# Patient Record
Sex: MALE | Race: WHITE | NOT HISPANIC OR LATINO | URBAN - METROPOLITAN AREA
[De-identification: names, ages, dates, MRNs, and addresses within clinical notes are randomized per-mention and may not be internally consistent; named-entity substitution may affect disease eponyms.]

---

## 2017-05-15 ENCOUNTER — EMERGENCY (EMERGENCY)
Facility: HOSPITAL | Age: 22
LOS: 1 days | Discharge: HOME | End: 2017-05-15
Attending: EMERGENCY MEDICINE | Admitting: EMERGENCY MEDICINE
Payer: COMMERCIAL

## 2017-05-15 VITALS
TEMPERATURE: 99 F | RESPIRATION RATE: 18 BRPM | OXYGEN SATURATION: 100 % | DIASTOLIC BLOOD PRESSURE: 91 MMHG | HEART RATE: 96 BPM | SYSTOLIC BLOOD PRESSURE: 124 MMHG

## 2017-05-15 PROCEDURE — 73562 X-RAY EXAM OF KNEE 3: CPT | Mod: 26,LT

## 2017-05-15 PROCEDURE — 73130 X-RAY EXAM OF HAND: CPT | Mod: 26,RT

## 2017-05-15 PROCEDURE — 99284 EMERGENCY DEPT VISIT MOD MDM: CPT

## 2017-05-15 PROCEDURE — 90471 IMMUNIZATION ADMIN: CPT

## 2017-05-15 PROCEDURE — 99284 EMERGENCY DEPT VISIT MOD MDM: CPT | Mod: 25

## 2017-05-15 PROCEDURE — 73110 X-RAY EXAM OF WRIST: CPT | Mod: 26,RT

## 2017-05-15 PROCEDURE — 73110 X-RAY EXAM OF WRIST: CPT

## 2017-05-15 PROCEDURE — 73562 X-RAY EXAM OF KNEE 3: CPT

## 2017-05-15 PROCEDURE — 73130 X-RAY EXAM OF HAND: CPT

## 2017-05-15 PROCEDURE — 90715 TDAP VACCINE 7 YRS/> IM: CPT

## 2017-05-15 RX ORDER — BACITRACIN ZINC 500 UNIT/G
1 OINTMENT IN PACKET (EA) TOPICAL ONCE
Qty: 0 | Refills: 0 | Status: COMPLETED | OUTPATIENT
Start: 2017-05-15 | End: 2017-05-15

## 2017-05-15 RX ORDER — IBUPROFEN 200 MG
600 TABLET ORAL ONCE
Qty: 0 | Refills: 0 | Status: COMPLETED | OUTPATIENT
Start: 2017-05-15 | End: 2017-05-15

## 2017-05-15 RX ORDER — TETANUS TOXOID, REDUCED DIPHTHERIA TOXOID AND ACELLULAR PERTUSSIS VACCINE, ADSORBED 5; 2.5; 8; 8; 2.5 [IU]/.5ML; [IU]/.5ML; UG/.5ML; UG/.5ML; UG/.5ML
0.5 SUSPENSION INTRAMUSCULAR ONCE
Qty: 0 | Refills: 0 | Status: COMPLETED | OUTPATIENT
Start: 2017-05-15 | End: 2017-05-15

## 2017-05-15 RX ADMIN — TETANUS TOXOID, REDUCED DIPHTHERIA TOXOID AND ACELLULAR PERTUSSIS VACCINE, ADSORBED 0.5 MILLILITER(S): 5; 2.5; 8; 8; 2.5 SUSPENSION INTRAMUSCULAR at 13:05

## 2017-05-15 RX ADMIN — Medication 600 MILLIGRAM(S): at 13:05

## 2017-05-15 RX ADMIN — Medication 1 APPLICATION(S): at 13:20

## 2017-05-15 NOTE — ED PROVIDER NOTE - PLAN OF CARE
Apply bacitracin as directed. Take Motrin 600mg every 6 hours as needed for pain. Return to an ER for worsening symptoms or any other concerns.
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
## 2017-05-19 DIAGNOSIS — T23.101A BURN OF FIRST DEGREE OF RIGHT HAND, UNSPECIFIED SITE, INITIAL ENCOUNTER: ICD-10-CM

## 2017-05-19 DIAGNOSIS — Z23 ENCOUNTER FOR IMMUNIZATION: ICD-10-CM
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
## 2017-05-19 DIAGNOSIS — M79.641 PAIN IN RIGHT HAND: ICD-10-CM

## 2017-09-21 ENCOUNTER — APPOINTMENT (OUTPATIENT)
Dept: COLORECTAL SURGERY | Facility: CLINIC | Age: 22
End: 2017-09-21

## 2021-06-19 ENCOUNTER — EMERGENCY (EMERGENCY)
Facility: HOSPITAL | Age: 26
LOS: 1 days | Discharge: HOME | End: 2021-06-19
Attending: EMERGENCY MEDICINE
Payer: COMMERCIAL

## 2021-06-19 VITALS
RESPIRATION RATE: 18 BRPM | WEIGHT: 130.07 LBS | OXYGEN SATURATION: 99 % | HEIGHT: 71 IN | HEART RATE: 95 BPM | TEMPERATURE: 99 F | SYSTOLIC BLOOD PRESSURE: 113 MMHG | DIASTOLIC BLOOD PRESSURE: 81 MMHG

## 2021-06-19 LAB
ALBUMIN SERPL ELPH-MCNC: 5.1 G/DL — HIGH (ref 3.3–5)
ALP SERPL-CCNC: 66 U/L — SIGNIFICANT CHANGE UP (ref 40–120)
ALT FLD-CCNC: 13 U/L — SIGNIFICANT CHANGE UP (ref 10–45)
ANION GAP SERPL CALC-SCNC: 14 MMOL/L — SIGNIFICANT CHANGE UP (ref 5–17)
AST SERPL-CCNC: 18 U/L — SIGNIFICANT CHANGE UP (ref 10–40)
BASOPHILS # BLD AUTO: 0.05 K/UL — SIGNIFICANT CHANGE UP (ref 0–0.2)
BASOPHILS NFR BLD AUTO: 0.5 % — SIGNIFICANT CHANGE UP (ref 0–2)
BILIRUB SERPL-MCNC: 1 MG/DL — SIGNIFICANT CHANGE UP (ref 0.2–1.2)
BUN SERPL-MCNC: 16 MG/DL — SIGNIFICANT CHANGE UP (ref 7–23)
CALCIUM SERPL-MCNC: 10.1 MG/DL — SIGNIFICANT CHANGE UP (ref 8.4–10.5)
CHLORIDE SERPL-SCNC: 102 MMOL/L — SIGNIFICANT CHANGE UP (ref 96–108)
CO2 SERPL-SCNC: 23 MMOL/L — SIGNIFICANT CHANGE UP (ref 22–31)
CREAT SERPL-MCNC: 1.04 MG/DL — SIGNIFICANT CHANGE UP (ref 0.5–1.3)
EOSINOPHIL # BLD AUTO: 0.11 K/UL — SIGNIFICANT CHANGE UP (ref 0–0.5)
EOSINOPHIL NFR BLD AUTO: 1.1 % — SIGNIFICANT CHANGE UP (ref 0–6)
GLUCOSE SERPL-MCNC: 87 MG/DL — SIGNIFICANT CHANGE UP (ref 70–99)
HCT VFR BLD CALC: 44.1 % — SIGNIFICANT CHANGE UP (ref 39–50)
HGB BLD-MCNC: 15.1 G/DL — SIGNIFICANT CHANGE UP (ref 13–17)
IMM GRANULOCYTES NFR BLD AUTO: 0.3 % — SIGNIFICANT CHANGE UP (ref 0–1.5)
LIDOCAIN IGE QN: 24 U/L — SIGNIFICANT CHANGE UP (ref 7–60)
LYMPHOCYTES # BLD AUTO: 2.24 K/UL — SIGNIFICANT CHANGE UP (ref 1–3.3)
LYMPHOCYTES # BLD AUTO: 22.4 % — SIGNIFICANT CHANGE UP (ref 13–44)
MCHC RBC-ENTMCNC: 28.8 PG — SIGNIFICANT CHANGE UP (ref 27–34)
MCHC RBC-ENTMCNC: 34.2 GM/DL — SIGNIFICANT CHANGE UP (ref 32–36)
MCV RBC AUTO: 84 FL — SIGNIFICANT CHANGE UP (ref 80–100)
MONOCYTES # BLD AUTO: 0.83 K/UL — SIGNIFICANT CHANGE UP (ref 0–0.9)
MONOCYTES NFR BLD AUTO: 8.3 % — SIGNIFICANT CHANGE UP (ref 2–14)
NEUTROPHILS # BLD AUTO: 6.75 K/UL — SIGNIFICANT CHANGE UP (ref 1.8–7.4)
NEUTROPHILS NFR BLD AUTO: 67.4 % — SIGNIFICANT CHANGE UP (ref 43–77)
NRBC # BLD: 0 /100 WBCS — SIGNIFICANT CHANGE UP (ref 0–0)
PLATELET # BLD AUTO: 213 K/UL — SIGNIFICANT CHANGE UP (ref 150–400)
POTASSIUM SERPL-MCNC: 3.8 MMOL/L — SIGNIFICANT CHANGE UP (ref 3.5–5.3)
POTASSIUM SERPL-SCNC: 3.8 MMOL/L — SIGNIFICANT CHANGE UP (ref 3.5–5.3)
PROT SERPL-MCNC: 7.9 G/DL — SIGNIFICANT CHANGE UP (ref 6–8.3)
RBC # BLD: 5.25 M/UL — SIGNIFICANT CHANGE UP (ref 4.2–5.8)
RBC # FLD: 12.6 % — SIGNIFICANT CHANGE UP (ref 10.3–14.5)
SODIUM SERPL-SCNC: 139 MMOL/L — SIGNIFICANT CHANGE UP (ref 135–145)
WBC # BLD: 10.01 K/UL — SIGNIFICANT CHANGE UP (ref 3.8–10.5)
WBC # FLD AUTO: 10.01 K/UL — SIGNIFICANT CHANGE UP (ref 3.8–10.5)

## 2021-06-19 PROCEDURE — 99284 EMERGENCY DEPT VISIT MOD MDM: CPT | Mod: 25

## 2021-06-19 PROCEDURE — 83690 ASSAY OF LIPASE: CPT

## 2021-06-19 PROCEDURE — 71046 X-RAY EXAM CHEST 2 VIEWS: CPT | Mod: 26

## 2021-06-19 PROCEDURE — 71046 X-RAY EXAM CHEST 2 VIEWS: CPT

## 2021-06-19 PROCEDURE — 99284 EMERGENCY DEPT VISIT MOD MDM: CPT

## 2021-06-19 PROCEDURE — 80053 COMPREHEN METABOLIC PANEL: CPT

## 2021-06-19 PROCEDURE — 96374 THER/PROPH/DIAG INJ IV PUSH: CPT

## 2021-06-19 PROCEDURE — 85025 COMPLETE CBC W/AUTO DIFF WBC: CPT

## 2021-06-19 RX ORDER — FAMOTIDINE 10 MG/ML
20 INJECTION INTRAVENOUS ONCE
Refills: 0 | Status: COMPLETED | OUTPATIENT
Start: 2021-06-19 | End: 2021-06-19

## 2021-06-19 RX ORDER — PANTOPRAZOLE SODIUM 20 MG/1
40 TABLET, DELAYED RELEASE ORAL
Refills: 0 | Status: DISCONTINUED | OUTPATIENT
Start: 2021-06-19 | End: 2021-06-19

## 2021-06-19 RX ADMIN — FAMOTIDINE 20 MILLIGRAM(S): 10 INJECTION INTRAVENOUS at 22:52

## 2021-06-19 RX ADMIN — PANTOPRAZOLE SODIUM 40 MILLIGRAM(S): 20 TABLET, DELAYED RELEASE ORAL at 23:09

## 2021-06-19 NOTE — ED PROVIDER NOTE - CLINICAL SUMMARY MEDICAL DECISION MAKING FREE TEXT BOX
26 years old M with heartburn, epigastric pain, nausea, and vomiting x wks. concern for GERD, gastritis, PUD. Labs unremarkable. Lipase wnl. Physical exam unremarkable. CXR. IV pepcid, f/u Maslavi for endoscopy. ZR

## 2021-06-19 NOTE — ED ADULT NURSE REASSESSMENT NOTE - NS ED NURSE REASSESS COMMENT FT1
QRN note: 20g IV placed in left AC. Bloods drawn. Instructed patient to leave IV in place. Instructed patient to wait in waiting room until name is called. Instructed patient to let RN know if they are experiencing any physiological changes.

## 2021-06-19 NOTE — ED PROVIDER NOTE - NSFOLLOWUPCLINICS_GEN_ALL_ED_FT
[facility]  Gastroenterology  [address] [place] [address] [address]  [place] [address]  Phone: [phone]  Fax:

## 2021-06-19 NOTE — ED ADULT NURSE NOTE - OBJECTIVE STATEMENT
26 year old male c/o epigastric pain. PMH includes spontaneous pneumothorax. Pt A+Ox3, reports intermittent episodes of heart burn for a few months, again and worse today at 5pm. Pt reports taking Maalox at 26 year old male c/o epigastric pain. PMH includes spontaneous pneumothorax. Pt A+Ox3, reports intermittent episodes of heart burn for a few months, again and worse today at 5pm. Pt reports taking Maalox at 5:30pm with little relief, and denies exacerbating factors. Pt usually takes Tums or Zantac at home to manage symptoms, and takes 600mg Tylenol Q2 days. Pt reports loose stools, however denies blood. Pt also denies headache, dizziness, SOB, vomiting, urinary symptoms, fevers or chills. EKG completed, pt attached to cardiac monitor.

## 2021-06-19 NOTE — ED PROVIDER NOTE - PHYSICAL EXAMINATION
NAD, NCAT, MMM, Trachea midline, PERRL, CTAB, Normal rate and rhythm, Normal perfusion, soft, NTND, No edema, No deformity of extremities, Appropriate, Cooperative, No rashes, CN grossly intact, Normal coordination, No focal motor or sensory deficits.

## 2021-06-19 NOTE — ED ADULT TRIAGE NOTE - CHIEF COMPLAINT QUOTE
Heart burn x 1.5 intermittently, pt had worse episode today with vomiting.  Pt c/o epigastric burning pain.

## 2021-06-19 NOTE — ED PROVIDER NOTE - NSFOLLOWUPINSTRUCTIONS_ED_ALL_ED_FT
1. You presented to the emergency department for:  epigastric pain and vomiting    2. Your evaluation in the emergency department included a physician evaluation and testing consisting of: lab work and chest xray. Your tests did not reveal any findings indicating the need for admission to the hospital or an emergent intervention at this time.     3. It is recommended that you follow-up with gastroenterology within 5-7 days as discussed for a repeat evaluation, and potentially further testing and treatment. The contact information to arrange an appointment is available in your paper work, but you should be called to arrange an appointment.    If needed, to arrange an appointment with a primary care provider please call: [phone]    4. Please continue taking your regular medications, however it is recommended that you discontinue the medication you are taking for your stomach acid as discussed, and motrin. For your gastric reflux, a prescription is available for you to  at your pharmacy. Please read and adhere to the instructions for use available on the packaging. Additionally, please read the warnings on the packaging before use. If you have any questions regarding your prescription, you may refer them to the pharmacist.    You can use 500-1000mg Tylenol every 6 hours for your headache - as needed.  This is an over-the-counter medications - please respect the warnings on the label. This medication comes with certain risks and side effects that you need to discuss with your doctor, especially if you are taking it for a prolonged period.    5. PLEASE RETURN TO THE EMERGENCY DEPARTMENT IMMEDIATELY IF you have any fevers, uncontrollable nausea and vomiting, lightheadedness, inability to tolerate eating and drinking, difficulty breathing, chest pain, severe increase in your symptoms or pain, or an other new symptoms that concern you.

## 2021-06-19 NOTE — ED PROVIDER NOTE - PATIENT PORTAL LINK FT
You can access the FollowMyHealth Patient Portal offered by [facility] by registering at the following website: [email or web address]. By joining [facility]’s FollowMyHealth portal, you will also be able to view your health information using other applications (apps) compatible with our system.
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
## 2021-06-19 NOTE — ED PROVIDER NOTE - RAPID ASSESSMENT
26y M with PMHx of Spontaneous pneumothorax, presents to the ED c/o intermittent episodes of heartburn x past few months, an additional episode today at 5 PM. Took maalox at 5:30 with little improvement. Reports worsening symptoms after eating. Denies sharp pain in chest, f/c, n/v, sick contacts. Usually takes tums or zantac to manage symptoms at home. Pt takes 600 mg of Ibuprofen every 2 days. Denies blood in stool but states it has been loose. Denies frequent alcohol use, no abd pshx.     ISuellen (Erasmo), have documented this rapid assessment note under the dictation of Bony Berg(PA) , which has been reviewed and affirmed to be accurate.  Patient was seen as a QPA patient. The patient will be seen and further worked up in the main emergency department and their care will be completed by the main emergency department team along with a thorough physical exam. Receiving team will follow up on labs, analgesia, any clinical imaging, reassess and disposition as clinically indicated, all decisions regarding the progression of care will be made at their discretion. 26y M with PMHx of Spontaneous pneumothorax, presents to the ED c/o intermittent episodes of heartburn x past few months, an additional episode today at 5 PM. Took maalox at 5:30 with little improvement. Reports worsening symptoms after eating. Denies sharp pain in chest, pleuritic pain, sob f/c, n/v, sick contacts. Usually takes tums or zantac to manage symptoms at home. Pt takes 600 mg of Ibuprofen every 2 days. Denies blood in stool but states it has been loose. Denies frequent alcohol use, no abd pshx.     ISuellen (Yadiraibgrey), have documented this rapid assessment note under the dictation of Bony Berg(PA) , which has been reviewed and affirmed to be accurate.  Patient was seen as a QPA patient. The patient will be seen and further worked up in the main emergency department and their care will be completed by the main emergency department team along with a thorough physical exam. Receiving team will follow up on labs, analgesia, any clinical imaging, reassess and disposition as clinically indicated, all decisions regarding the progression of care will be made at their discretion.    Bony Berg PA-C: The scribe's documentation has been prepared under my direction and personally reviewed by me in its entirety. I confirm that the note above accurately reflects history obtained.   Patient was rapidly assessed via telemedicine encounter; a limited history was obtained. The patient will be seen and further examined and worked up in the main ED and their care will be completed by the main ED team.

## 2021-06-20 VITALS
DIASTOLIC BLOOD PRESSURE: 83 MMHG | TEMPERATURE: 98 F | HEART RATE: 96 BPM | SYSTOLIC BLOOD PRESSURE: 120 MMHG | OXYGEN SATURATION: 100 % | RESPIRATION RATE: 18 BRPM

## 2021-06-20 PROBLEM — J93.83 OTHER PNEUMOTHORAX: Chronic | Status: ACTIVE | Noted: 2017-05-15

## 2021-06-20 RX ORDER — OMEPRAZOLE 10 MG/1
1 CAPSULE, DELAYED RELEASE ORAL
Qty: 14 | Refills: 0
Start: 2021-06-20 | End: 2021-07-03

## 2023-08-03 NOTE — ED ADULT NURSE NOTE - CAS EDN DISCHARGE INTERVENTIONS
IV discontinued, cath removed intact VTAMA Counseling: I discussed with the patient that VTAMA is not for use in the eyes, mouth or mouth. They should call the office if they develop any signs of allergic reactions to VTAMA. The patient verbalized understanding of the proper use and possible adverse effects of VTAMA.  All of the patient's questions and concerns were addressed.
